# Patient Record
Sex: FEMALE | URBAN - METROPOLITAN AREA
[De-identification: names, ages, dates, MRNs, and addresses within clinical notes are randomized per-mention and may not be internally consistent; named-entity substitution may affect disease eponyms.]

---

## 2019-08-21 ENCOUNTER — OFFICE VISIT (OUTPATIENT)
Dept: URBAN - METROPOLITAN AREA CLINIC 22 | Facility: CLINIC | Age: 49
End: 2019-08-21
Payer: COMMERCIAL

## 2019-08-21 DIAGNOSIS — H02.052 TRICHIASIS WITHOUT ENTROPION RIGHT LOWER EYELID: ICD-10-CM

## 2019-08-21 DIAGNOSIS — H11.441 CONJUNCTIVAL CYSTS, RIGHT EYE: Primary | ICD-10-CM

## 2019-08-21 PROCEDURE — 99203 OFFICE O/P NEW LOW 30 MIN: CPT | Performed by: OPTOMETRIST

## 2019-08-21 PROCEDURE — 67820 REVISE EYELASHES: CPT | Performed by: OPTOMETRIST

## 2019-08-21 ASSESSMENT — INTRAOCULAR PRESSURE
OS: 21
OD: 27
OD: 20
OS: 27

## 2019-08-21 NOTE — IMPRESSION/PLAN
Impression: Trichiasis without entropion right lower eyelid: H02.052. OD. Plan: Pt ed re: condition, discussed R/B/A of epilation, pt elected to proceed w/procedure. Instilled 1gtt proparacaine OS. Epilated 1 lash from lower eyelid w/forceps, pt tolerated well. Use AT's prn x lubrication. RTC prn as condition persists.

## 2019-08-21 NOTE — IMPRESSION/PLAN
Impression: Conjunctival cysts, right eye: H11.441. OD. Plan: Pt ed re: condition. Advised to use AT's prn x lubrication/comfort. Condition should resolve on its own over time. RTC if condition persists, or vision changes. otherwise, RTC 2-3 months x [facility] w/Dr. Gloria Raines, as scheduled.

## 2019-11-09 ENCOUNTER — OFFICE VISIT (OUTPATIENT)
Dept: URBAN - METROPOLITAN AREA CLINIC 22 | Facility: CLINIC | Age: 49
End: 2019-11-09
Payer: COMMERCIAL

## 2019-11-09 PROCEDURE — 92014 COMPRE OPH EXAM EST PT 1/>: CPT | Performed by: OPTOMETRIST

## 2019-11-09 PROCEDURE — 92310 CONTACT LENS FITTING OU: CPT | Performed by: OPTOMETRIST

## 2019-11-09 ASSESSMENT — INTRAOCULAR PRESSURE
OD: 28
OS: 21
OS: 28
OD: 21

## 2019-11-09 ASSESSMENT — VISUAL ACUITY
OS: 20/20
OD: 20/20

## 2019-11-09 ASSESSMENT — KERATOMETRY
OS: 2269.9
OD: 44.91

## 2019-11-09 NOTE — IMPRESSION/PLAN
Impression: Myopia, bilateral: H52.13. Plan: Change in Rx OD. Trial Multifocal contact. DFE at cl check.

## 2019-11-21 ENCOUNTER — OFFICE VISIT (OUTPATIENT)
Dept: URBAN - METROPOLITAN AREA CLINIC 22 | Facility: CLINIC | Age: 49
End: 2019-11-21

## 2019-11-21 DIAGNOSIS — H52.13 MYOPIA, BILATERAL: Primary | ICD-10-CM

## 2019-11-21 DIAGNOSIS — H52.4 PRESBYOPIA: ICD-10-CM

## 2019-11-21 PROCEDURE — 92310 CONTACT LENS FITTING OU: CPT | Performed by: OPTOMETRIST

## 2019-11-21 NOTE — IMPRESSION/PLAN
Impression: Myopia, bilateral: H52.13. Plan: Healthy posterior segment on DFE today. Order trial contacts.